# Patient Record
Sex: FEMALE | Race: WHITE | NOT HISPANIC OR LATINO | URBAN - METROPOLITAN AREA
[De-identification: names, ages, dates, MRNs, and addresses within clinical notes are randomized per-mention and may not be internally consistent; named-entity substitution may affect disease eponyms.]

---

## 2023-09-18 ENCOUNTER — OFFICE (OUTPATIENT)
Dept: URBAN - METROPOLITAN AREA CLINIC 16 | Facility: CLINIC | Age: 48
End: 2023-09-18

## 2023-09-18 VITALS
OXYGEN SATURATION: 98 % | DIASTOLIC BLOOD PRESSURE: 72 MMHG | HEIGHT: 70 IN | HEART RATE: 73 BPM | SYSTOLIC BLOOD PRESSURE: 118 MMHG | WEIGHT: 237 LBS

## 2023-09-18 DIAGNOSIS — R13.10 DYSPHAGIA, UNSPECIFIED: ICD-10-CM

## 2023-09-18 PROCEDURE — 99204 OFFICE O/P NEW MOD 45 MIN: CPT | Performed by: INTERNAL MEDICINE

## 2023-09-18 NOTE — SERVICENOTES
We will proceed with exam as we discussed with the EGD and dilatation and she may try Nexium 24 hours over-the-counter to see if this gives her some relief Even ENT evaluation may be beneficial if nothing is found on her work

## 2023-09-18 NOTE — SERVICEHPINOTES
Sissy Chase   is a   48   year old   female   patient who is seen   at the request of   Jennifer Johnson   for a consultation/initial visit.  Sissy is 48 years old and has a history of dysphagia and history of food sticking in the lower esophagus and sometimes he will cricopharyngeal and has a family history of reflux related Schatzki's ring etc. no family history as she knows of EOE etc. she does have a history of colon polyps and in fact had been on the 3-year surveillance protocol x2 and then possibly recommended for 5 years from her last exam in 2020. We will obtain the records from digestive specialists

## 2023-10-11 ENCOUNTER — OFFICE (OUTPATIENT)
Dept: URBAN - METROPOLITAN AREA PATHOLOGY 1 | Facility: PATHOLOGY | Age: 48
End: 2023-10-11

## 2023-10-11 ENCOUNTER — AMBULATORY SURGICAL CENTER (OUTPATIENT)
Dept: URBAN - METROPOLITAN AREA SURGERY 5 | Facility: SURGERY | Age: 48
End: 2023-10-11

## 2023-10-11 VITALS
HEART RATE: 70 BPM | RESPIRATION RATE: 16 BRPM | HEART RATE: 75 BPM | DIASTOLIC BLOOD PRESSURE: 81 MMHG | RESPIRATION RATE: 12 BRPM | SYSTOLIC BLOOD PRESSURE: 117 MMHG | TEMPERATURE: 97.4 F | OXYGEN SATURATION: 95 % | SYSTOLIC BLOOD PRESSURE: 134 MMHG | RESPIRATION RATE: 13 BRPM | HEART RATE: 75 BPM | DIASTOLIC BLOOD PRESSURE: 56 MMHG | OXYGEN SATURATION: 96 % | WEIGHT: 225 LBS | SYSTOLIC BLOOD PRESSURE: 118 MMHG | OXYGEN SATURATION: 96 % | OXYGEN SATURATION: 88 % | SYSTOLIC BLOOD PRESSURE: 148 MMHG | SYSTOLIC BLOOD PRESSURE: 133 MMHG | HEART RATE: 83 BPM | SYSTOLIC BLOOD PRESSURE: 118 MMHG | HEART RATE: 86 BPM | DIASTOLIC BLOOD PRESSURE: 78 MMHG | HEIGHT: 70 IN | SYSTOLIC BLOOD PRESSURE: 134 MMHG | HEART RATE: 70 BPM | DIASTOLIC BLOOD PRESSURE: 85 MMHG | SYSTOLIC BLOOD PRESSURE: 133 MMHG | HEART RATE: 86 BPM | RESPIRATION RATE: 12 BRPM | OXYGEN SATURATION: 88 % | DIASTOLIC BLOOD PRESSURE: 80 MMHG | DIASTOLIC BLOOD PRESSURE: 56 MMHG | WEIGHT: 225 LBS | DIASTOLIC BLOOD PRESSURE: 46 MMHG | HEART RATE: 77 BPM | HEART RATE: 83 BPM | HEART RATE: 66 BPM | DIASTOLIC BLOOD PRESSURE: 80 MMHG | RESPIRATION RATE: 13 BRPM | SYSTOLIC BLOOD PRESSURE: 139 MMHG | HEART RATE: 61 BPM | TEMPERATURE: 97.4 F | HEART RATE: 77 BPM | HEART RATE: 66 BPM | OXYGEN SATURATION: 99 % | SYSTOLIC BLOOD PRESSURE: 148 MMHG | HEIGHT: 70 IN | HEART RATE: 61 BPM | OXYGEN SATURATION: 99 % | DIASTOLIC BLOOD PRESSURE: 85 MMHG | SYSTOLIC BLOOD PRESSURE: 139 MMHG | DIASTOLIC BLOOD PRESSURE: 81 MMHG | RESPIRATION RATE: 16 BRPM | DIASTOLIC BLOOD PRESSURE: 78 MMHG | OXYGEN SATURATION: 95 % | SYSTOLIC BLOOD PRESSURE: 117 MMHG | DIASTOLIC BLOOD PRESSURE: 46 MMHG

## 2023-10-11 DIAGNOSIS — K31.89 OTHER DISEASES OF STOMACH AND DUODENUM: ICD-10-CM

## 2023-10-11 DIAGNOSIS — K22.2 ESOPHAGEAL OBSTRUCTION: ICD-10-CM

## 2023-10-11 DIAGNOSIS — R13.10 DYSPHAGIA, UNSPECIFIED: ICD-10-CM

## 2023-10-11 DIAGNOSIS — K21.00 GASTRO-ESOPHAGEAL REFLUX DISEASE WITH ESOPHAGITIS, WITHOUT B: ICD-10-CM

## 2023-10-11 PROCEDURE — 43239 EGD BIOPSY SINGLE/MULTIPLE: CPT | Performed by: INTERNAL MEDICINE

## 2023-10-11 PROCEDURE — 43450 DILATE ESOPHAGUS 1/MULT PASS: CPT | Performed by: INTERNAL MEDICINE

## 2023-10-11 PROCEDURE — 88305 TISSUE EXAM BY PATHOLOGIST: CPT | Performed by: PATHOLOGY

## 2023-10-11 NOTE — SERVICEHPINOTES
Sissy is 48 years old and has a history of dysphagia and history of food sticking in the lower esophagus and sometimes he will cricopharyngeal level

## 2023-10-11 NOTE — SERVICENOTES
She had a Schatzki's ring and dilatation successfully and will continue PPI and monitor for improvement.  Biopsies for completeness for EOE taken as well

## 2023-10-16 LAB
PDF: PDF REPORT: (no result)
PDF: PDF REPORT: (no result)

## 2023-11-28 ENCOUNTER — OFFICE (OUTPATIENT)
Dept: URBAN - METROPOLITAN AREA CLINIC 16 | Facility: CLINIC | Age: 48
End: 2023-11-28
Payer: COMMERCIAL

## 2023-11-28 VITALS
SYSTOLIC BLOOD PRESSURE: 122 MMHG | WEIGHT: 225 LBS | DIASTOLIC BLOOD PRESSURE: 70 MMHG | OXYGEN SATURATION: 98 % | HEIGHT: 70 IN | HEART RATE: 61 BPM

## 2023-11-28 DIAGNOSIS — R13.10 DYSPHAGIA, UNSPECIFIED: ICD-10-CM

## 2023-11-28 PROCEDURE — 99214 OFFICE O/P EST MOD 30 MIN: CPT | Performed by: INTERNAL MEDICINE

## 2023-11-28 NOTE — SERVICEHPINOTES
The patient has previously complained of dysphagia.   The etiology is considered to be from   Schatzki's ring  .     Prior treatment has included   bougie dilation  .    Therapy resulted in   no response  .    
christopher Irvin has a history of cricopharyngeal dysphagia and globus sensation and had seen digestive specialist in the past. Regardless she had issues accordingly and had the EGD done in October 11, 2023 and had Schatzki's ring to be seen and had 58 Welsh dilatation performed at that time. That did not affect her underlying globus sensation. Given this we will have further ultrasound imaging barium study with esophagram and an ENT referral for completeness. She had seen digestive specialist in the past and colonoscopy in 2020 as well

## 2023-11-28 NOTE — SERVICENOTES
She may continue PPI but will have further evaluation for globus sensation and may follow-up accordingly in the future

## 2024-08-13 ENCOUNTER — OFFICE (OUTPATIENT)
Dept: URBAN - METROPOLITAN AREA CLINIC 16 | Facility: CLINIC | Age: 49
End: 2024-08-13
Payer: COMMERCIAL

## 2024-08-13 VITALS
SYSTOLIC BLOOD PRESSURE: 122 MMHG | HEIGHT: 70 IN | HEART RATE: 72 BPM | DIASTOLIC BLOOD PRESSURE: 74 MMHG | WEIGHT: 230 LBS

## 2024-08-13 DIAGNOSIS — K21.9 GASTRO-ESOPHAGEAL REFLUX DISEASE WITHOUT ESOPHAGITIS: ICD-10-CM

## 2024-08-13 DIAGNOSIS — Z83.718 OTHER FAMILY HISTORY OF COLON POLYPS: ICD-10-CM

## 2024-08-13 DIAGNOSIS — Z86.010 PERSONAL HISTORY OF COLONIC POLYPS: ICD-10-CM

## 2024-08-13 PROCEDURE — 99204 OFFICE O/P NEW MOD 45 MIN: CPT | Performed by: INTERNAL MEDICINE

## 2024-08-13 NOTE — SERVICEHPINOTES
Sissy Chase   is seen today for a follow-up visit.     Sissy had seen me after the EGD and had issues with dysphagia and concern for underlying Schatzki's ring and had the EGD in October 2023 with Schatzki's ring that had been found and had 58 [place] dilatation performed. She had symptoms of globus sensation to be persistent regardless and had further recommendations for ultrasound and barium studies ENT investigation referral for completeness. I was able to review her records accordingly and she had the colonoscopy in 2019 February with internal hemorrhoids and a normal colonoscopy otherwise to the cecum and appendiceal orifice to be seen. She was treated for hemorrhoids at that time from digestive specialists. I will scan the records and from February 2019. In regards to her colonoscopy by Dr. Pendleton in 2011 August she did have colon polyp findings at that time. She is otherwise due for colonoscopy for history of colon polyps And successful follow-up with Dr. Rockwell in 2014 with a 3-year for high risk adenoma status to be negative and devoid of further polyp
br
She has ongoing GERD and will need trail of ppi bid.  . She will have increased from 20 mg twice a day and I can even consider 40 mg and have that an increased dose in the future if needed

## 2024-08-13 NOTE — SERVICENOTES
She will have surveillance colonoscopy alone but have hemorrhoidal management medically in the meantime.  If she has persistent issues for hemorrhoids that are not amenable to hemorrhoidal treatment then she may need to have banding with myself in the future.  She has ongoing GERD and will need trail of ppi bid

## 2024-09-10 PROBLEM — K64.0 FIRST DEGREE HEMORRHOIDS: Status: ACTIVE | Noted: 2024-09-10

## 2024-09-10 PROBLEM — Z86.010 SURVEILLANCE DUE TO PRIOR COLONIC NEOPLASIA: Status: ACTIVE | Noted: 2024-09-10

## 2024-09-10 PROBLEM — K57.30 DIVERTICULOSIS OF LARGE INTESTINE WITHOUT PERFORATION OR ABS: Status: ACTIVE | Noted: 2024-09-10
